# Patient Record
Sex: MALE
[De-identification: names, ages, dates, MRNs, and addresses within clinical notes are randomized per-mention and may not be internally consistent; named-entity substitution may affect disease eponyms.]

---

## 2018-02-14 ENCOUNTER — HOSPITAL ENCOUNTER (INPATIENT)
Dept: HOSPITAL 31 - C.ER | Age: 33
LOS: 5 days | Discharge: HOME | DRG: 744 | End: 2018-02-19
Attending: PSYCHIATRY & NEUROLOGY | Admitting: PSYCHIATRY & NEUROLOGY
Payer: MEDICAID

## 2018-02-14 DIAGNOSIS — B19.20: ICD-10-CM

## 2018-02-14 DIAGNOSIS — F17.200: ICD-10-CM

## 2018-02-14 DIAGNOSIS — F13.20: ICD-10-CM

## 2018-02-14 DIAGNOSIS — F10.10: ICD-10-CM

## 2018-02-14 DIAGNOSIS — F11.23: Primary | ICD-10-CM

## 2018-02-14 LAB
ALBUMIN SERPL-MCNC: 3.6 G/DL (ref 3.5–5)
ALBUMIN/GLOB SERPL: 1 {RATIO} (ref 1–2.1)
ALT SERPL-CCNC: 14 U/L (ref 21–72)
AST SERPL-CCNC: 25 U/L (ref 17–59)
BASOPHILS # BLD AUTO: 0.1 K/UL (ref 0–0.2)
BASOPHILS NFR BLD: 0.8 % (ref 0–2)
BUN SERPL-MCNC: 14 MG/DL (ref 9–20)
CALCIUM SERPL-MCNC: 9.1 MG/DL (ref 8.6–10.4)
EOSINOPHIL # BLD AUTO: 0.4 K/UL (ref 0–0.7)
EOSINOPHIL NFR BLD: 4.1 % (ref 0–4)
ERYTHROCYTE [DISTWIDTH] IN BLOOD BY AUTOMATED COUNT: 16.1 % (ref 11.5–14.5)
GFR NON-AFRICAN AMERICAN: > 60
HGB BLD-MCNC: 12.4 G/DL (ref 12–18)
LYMPHOCYTES # BLD AUTO: 2.3 K/UL (ref 1–4.3)
LYMPHOCYTES NFR BLD AUTO: 26 % (ref 20–40)
MCH RBC QN AUTO: 27 PG (ref 27–31)
MCHC RBC AUTO-ENTMCNC: 33 G/DL (ref 33–37)
MCV RBC AUTO: 81.9 FL (ref 80–94)
MONOCYTES # BLD: 0.7 K/UL (ref 0–0.8)
MONOCYTES NFR BLD: 7.6 % (ref 0–10)
NEUTROPHILS # BLD: 5.3 K/UL (ref 1.8–7)
NEUTROPHILS NFR BLD AUTO: 61.5 % (ref 50–75)
NRBC BLD AUTO-RTO: 0 % (ref 0–2)
PLATELET # BLD: 395 K/UL (ref 130–400)
PMV BLD AUTO: 7 FL (ref 7.2–11.7)
RBC # BLD AUTO: 4.59 MIL/UL (ref 4.4–5.9)
WBC # BLD AUTO: 8.7 K/UL (ref 4.8–10.8)

## 2018-02-14 NOTE — C.PDOC
History Of Present Illness


The patient presents to the ED requesting alcohol and heroin detox. Patient 

states he last used both this morning. Patient has already been prescreened. He 

denies suicidal/homicidal ideation and has no physical complaints at this time. 


Time Seen by Provider: 02/14/18 19:24


Chief Complaint (Nursing): Substance Abuse


History Per: Patient


History/Exam Limitations: no limitations


Onset/Duration Of Symptoms: Hrs


Current Symptoms Are (Timing): Still Present


Suicide/Self Injury Attempted (Context): None


Modifying Factor(s): Alcohol, Narcotics (heroin)


Severity: Mild


Pain Scale Rating Of: 2


Associated Symptoms: denies: Suicidal Thoughts, Suicidal Plan


Involuntary Hold By: None


Recent travel outside of the [place]: No


Additional History Per: Patient





Past Medical History


Reviewed: Historical Data, Nursing Documentation, Vital Signs


Vital Signs: 


 Last Vital Signs











Temp  98 F   02/14/18 19:06


 


Pulse  111 H  02/14/18 19:06


 


Resp  18   02/14/18 19:06


 


BP  145/89   02/14/18 19:06


 


Pulse Ox  100   02/14/18 19:51














- Medical History


PMH: No Chronic Diseases


Surgical History: No Surg Hx


Family History: States: Unknown Family Hx





- Social History


Hx Alcohol Use: Yes


Hx Substance Use: Yes





Review Of Systems


Constitutional: Negative for: Fever, Chills


ENT: Negative for: Throat Pain


Cardiovascular: Negative for: Chest Pain


Respiratory: Negative for: Cough, Shortness of Breath


Gastrointestinal: Negative for: Nausea, Vomiting, Abdominal Pain


Musculoskeletal: Negative for: Back Pain


Skin: Negative for: Rash


Neurological: Negative for: Weakness


Psych: Positive for: Other (prescreen for alcohol and heroin detox ).  Negative 

for: Suicidal ideation





Physical Exam





- Physical Exam


Appears: Non-toxic, No Acute Distress


Skin: Warm, Dry


Head: Normacephalic


Eye(s): bilateral: Normal Inspection


Oral Mucosa: Moist


Neck: Supple


Chest: Symmetrical, No Deformity, No Tenderness


Cardiovascular: Rhythm Regular, No Murmur


Respiratory: No Rales, No Rhonchi, No Wheezing


Gastrointestinal/Abdominal: Soft, No Tenderness


Male Genital: Normal Inspection


Extremity: Normal ROM, Capillary Refill (less than 2 seconds )


Extremity: Bilateral: Atraumatic


Neurological/Psych: Oriented x3


Gait: Steady





ED Course And Treatment





- Laboratory Results


Result Diagrams: 


 02/14/18 20:04





 02/14/18 20:04


O2 Sat by Pulse Oximetry: 100 (on RA)


Pulse Ox Interpretation: Normal


Progress Note: Bloodwork and urinalysis ordered and reviewed.





Disposition


Discussed With Dr.: Mary Moses


Comment: accepted the pt on his service and took over the care at 12:38AM


Doctor Will See Patient In The: Hospital


Counseled Patient/Family Regarding: Studies Performed, Diagnosis





- Disposition


Disposition: HOSPITALIZED


Disposition Time: 19:24


Condition: FAIR


Forms:  [facility] (English)





- POA


Present On Arrival: None





- Clinical Impression


Clinical Impression: 


 Drug abuse, Drug dependence, Alcohol abuse








- Scribe Statement


The provider has reviewed the documentation as recorded by the Scribe (Magdalena Navas)


Provider Attestation: 








All medical record entries made by the Scribe were at my direction and 

personally dictated by me. I have reviewed the chart and agree that the record 

accurately reflects my personal performance of the history, physical exam, 

medical decision making, and the department course for this patient. I have 

also personally directed, reviewed, and agree with the discharge instructions 

and disposition.





Decision To Admit





- Pt Status Changed To:


Hospital Disposition Of: Inpatient





- Admit Certification


Admit to Inpatient:: After my assessment, the patient will require 

hospitalization for at least two midnights.  This is because of the severity of 

symptoms shown, intensity of services needed, and/or the medical risk in this 

patient being treated as an outpatient.





- InPatient:


Physician Admission Certification: I certify that this patient requires 2 or 

more midnights of care for the following reason:: After my assessment, the 

patient will require hospitalization for at least two midnights.  This is 

because of the severity of symptoms shown, intensity of services needed, and/or 

the medical risk in this patient being treated as an outpatient.





- .


Bed Request Type: Detox


Admitting Physician: Mary Moses


Patient Diagnosis: 


 Drug abuse, Drug dependence, Alcohol abuse

## 2018-02-15 LAB
BILIRUB UR-MCNC: NEGATIVE MG/DL
GLUCOSE UR STRIP-MCNC: NORMAL MG/DL
LEUKOCYTE ESTERASE UR-ACNC: (no result) LEU/UL
PH UR STRIP: 5 [PH] (ref 5–8)
PROT UR STRIP-MCNC: NEGATIVE MG/DL
RBC # UR STRIP: NEGATIVE /UL
SP GR UR STRIP: 1.02 (ref 1–1.03)
URINE NITRATE: NEGATIVE
UROBILINOGEN UR-MCNC: NORMAL MG/DL (ref 0.2–1)

## 2018-02-15 PROCEDURE — HZ52ZZZ INDIVIDUAL PSYCHOTHERAPY FOR SUBSTANCE ABUSE TREATMENT, COGNITIVE-BEHAVIORAL: ICD-10-PCS | Performed by: PSYCHIATRY & NEUROLOGY

## 2018-02-15 PROCEDURE — HZ59ZZZ INDIVIDUAL PSYCHOTHERAPY FOR SUBSTANCE ABUSE TREATMENT, SUPPORTIVE: ICD-10-PCS | Performed by: PSYCHIATRY & NEUROLOGY

## 2018-02-15 PROCEDURE — HZ56ZZZ INDIVIDUAL PSYCHOTHERAPY FOR SUBSTANCE ABUSE TREATMENT, PSYCHOEDUCATION: ICD-10-PCS | Performed by: PSYCHIATRY & NEUROLOGY

## 2018-02-15 PROCEDURE — HZ81ZZZ MEDICATION MANAGEMENT FOR SUBSTANCE ABUSE TREATMENT, METHADONE MAINTENANCE: ICD-10-PCS | Performed by: PSYCHIATRY & NEUROLOGY

## 2018-02-15 PROCEDURE — HZ2ZZZZ DETOXIFICATION SERVICES FOR SUBSTANCE ABUSE TREATMENT: ICD-10-PCS | Performed by: PSYCHIATRY & NEUROLOGY

## 2018-02-15 NOTE — PCM.BM
<HeavenDelmi - Last Filed: 02/15/18 02:01>





Treatment Plan Problems





- Problems identified on initial assessmt


  ** Opiates dependence


Date Initiated: 02/15/18


Time Initiated: 01:15


Assessment reference: NA


Status: Active





Treatment assets and liabiliti


Patient Assests: cooperative, ADL independent, negotiates basic needs


Patient Liabilities: substance abuse





- Milieu Protocol


Maintain good personal hygiene: daily Encourage regular showers, daily Remind 

patient to perform daily oral care, daily Assist patient to perform ADL's


Maintain personal safety: every shift Educate patient to report safety concerns 

to staff, every shift Monitor environment for contraband/sharps


Medication safety: Monitor for expected outcome, potential side effects: every 

shift, Assess barriers to learning: every shift, Assess readiness for 

medication education: every shift





<Gilda Klein - Last Filed: 02/15/18 11:27>





Family Contact


Family involvement: Patient does not wish Family/SO involvement





- Goals for Treatment


Patient goals for treatment: Complete detox and apply for rehab.





Discharge/Continuing Care





- Education Needs


Education Needs: Family Diagnosis/Disease Process, Family Community resources, 

Patient Medication, Patient Diagnosis/Disease Process, Patient Coping Skills, 

Patient Anger Management skills, Patient Placement options, Patient Community 

resources





- Discharge


Discharge Criteria: Free of agitation, Normal sleep pattern, No longer 

exhibiting s/s of withdrawal, Reduction of target symptoms


Discharge to:: Substance Abuse Rehab





- Treatment Team Participation


Patient/Family/SO Statement: 





02/15/18 11:28


"I have [place] medicaid--I wanna go to rehab."


Discussed with Family/SO: No


Was Patient/Family/SO present at Treatment Team Meeting: Yes





<Mireya Kulkarni - Last Filed: 02/15/18 15:09>





- Diagnosis


(1) Opioid use disorder, severe, dependence


Status: Acute   


Interventions: 





02/15/18 15:09


* Assess 7x/week regarding severity of withdrawal


* Educate regarding risks, benefits, side effects and alternatives of 

medications


* Use Motivational Interviewing for abstinence


* Use CBT for relapse prevention


* Medication management for withdrawal symptoms


* Encourage medication assisted treatment


*

## 2018-02-16 RX ADMIN — BUPRENORPHINE HCL SCH MG: 2 TABLET SUBLINGUAL at 10:32

## 2018-02-16 NOTE — PCM.PYCHPN
Psychiatric Progress Note





- Psychiatric Progress Note


Patient seen today, length of contact: 15 min


Patient Chief Complaint: 


"I am withdrawing"





Problems Identified/Issues Discussed: 





The pt is seen, chart reviewed, case discussed with staff.


The pt is compliant with medications and reports no side-effects. Symptoms 

improving and patient needs more time to stabilize. After care discussed, 

support and psychoeducation given.The patient reports that he did not sleep 

last night because he had severe withdrawal symptoms. He denies asking the 

nurses for any of his PRN medications because he has not left his room. 








Medication Change: Yes (detox changes daily )


Medical Record Reviewed: Yes





Mental Status Examination





- Cognitive Function


Orientation: Person, Place, Situation, Time


Memory: Intact


Attention: WNL


Concentration: WNL


Association: WNL


Fund of Knowledge: [facility]


Decription of patient's judgement and insights: 





fair





- Mood


Mood: Depressed, Anxious





- Affect


Affect: Constricted





- Speech


Speech: Appropriate





- Formal Thought Process


Formal Thought Process: No Impairment





- Suicidal Ideation


Suicidal Ideation: No





- Homicidal Ideation


Homicidal Ideation: No





Goal/Treatment Plan





- Goal/Treatment Plan


Need for Continued Stay: Remain at risks for inpatient hospitalization, 

Discharge may exacerbated symptoms


Progress Toward Problem(s) and Goals/Treatment Plan: 





Opioid detox


Gabapentin for augmentation


As needed medications


All risks, benefits and alternatives of the meds discussed,


 and the pt agreed and understood. 


Attend groups and activities


Supportive therapy and psychoeducation


MI for abstinence


CBT for relapse prevention


Encourage MAT


Refer to rehab or IOP, and self-help groups


Smoking cessation with MI


Nicotine patch











- Smoking Cessation


Smoking Cessation Initiated: Yes

## 2018-02-17 RX ADMIN — BUPRENORPHINE HCL SCH MG: 2 TABLET SUBLINGUAL at 10:57

## 2018-02-17 NOTE — PCM.PYCHPN
Psychiatric Progress Note





- Psychiatric Progress Note


Patient seen today, length of contact: 15 min


Patient Chief Complaint: 





"I'm feeling better"


Problems Identified/Issues Discussed: 





The pt is seen, chart reviewed, case discussed with staff. Pt reported that he 

is feeling better. The pt is compliant with medications and reports no side-

effects.


Symptoms are improving but needs more time to stabilize.


After care discussed, support and psychoeducation given


DSM 5 Symptoms Update: 





opioid dependence, severe, withdrawal symptoms


benzo use disorder, severe, dependence


Medication Change: Yes (detox changes daily )


Medical Record Reviewed: Yes





Mental Status Examination





- Cognitive Function


Orientation: Person, Place, Situation, Time


Memory: Intact


Attention: WNL


Concentration: WNL


Association: WNL


Fund of Knowledge: [facility]


Decription of patient's judgement and insights: 





good/good


calm and cooperative





- Mood


Mood: Depressed, Anxious





- Affect


Affect: Constricted





- Speech


Speech: Appropriate





- Formal Thought Process


Formal Thought Process: No Impairment


Psychotic Thoughts and Behaviors: 





denied





- Suicidal Ideation


Suicidal Ideation: No


Plan: 





denied





- Homicidal Ideation


Homicidal Ideation: No


Plan: 





denied





Goal/Treatment Plan





- Goal/Treatment Plan


Need for Continued Stay: Remain at risks for inpatient hospitalization, 

Discharge may exacerbated symptoms


Progress Toward Problem(s) and Goals/Treatment Plan: 





continue current treatment and management as per primary team


Therapy in milieu





- Smoking Cessation


Smoking Cessation Initiated: Yes

## 2018-02-18 RX ADMIN — BUPRENORPHINE HCL SCH MG: 2 TABLET SUBLINGUAL at 10:43

## 2018-02-18 NOTE — PCM.PYCHPN
Psychiatric Progress Note





- Psychiatric Progress Note


Patient seen today, length of contact: 15 min


Patient Chief Complaint: 





"I'm feeling fine"


Problems Identified/Issues Discussed: 





The pt is seen, chart reviewed, case discussed with staff. Pt reported that he 

is feeling fine. The pt is compliant with medications and reports no side-

effects.


Symptoms are improving but needs more time to stabilize. He reported 

improvement in his withdrawal symptoms. 


After care discussed, support and psychoeducation given


DSM 5 Symptoms Update: 





Opioid use d/o, severe, with withdrawal symptoms


Sedative, hypnotic use disorder


Medication Change: Yes (detox changes daily )


Medical Record Reviewed: Yes





Mental Status Examination





- Cognitive Function


Orientation: Person, Place, Situation, Time


Memory: Intact


Attention: WNL


Concentration: WNL


Association: WNL


Fund of Knowledge: WNL


Decription of patient's judgement and insights: 





good/good


calm and cooperative 





- Mood


Mood: Depressed, Anxious





- Affect


Affect: Constricted





- Speech


Speech: Appropriate





- Formal Thought Process


Formal Thought Process: No Impairment


Psychotic Thoughts and Behaviors: 





denied 





- Suicidal Ideation


Suicidal Ideation: No


Plan: 





denied





- Homicidal Ideation


Homicidal Ideation: No


Plan: 





denied





Goal/Treatment Plan





- Goal/Treatment Plan


Need for Continued Stay: Remain at risks for inpatient hospitalization, 

Discharge may exacerbated symptoms


Progress Toward Problem(s) and Goals/Treatment Plan: 





continue current treatment and management as per primary team


Therapy in milieu


Supportive psychotherapy provided








- Smoking Cessation


Smoking Cessation Initiated: Yes

## 2018-02-19 VITALS
HEART RATE: 78 BPM | OXYGEN SATURATION: 100 % | TEMPERATURE: 98.1 F | DIASTOLIC BLOOD PRESSURE: 74 MMHG | RESPIRATION RATE: 20 BRPM | SYSTOLIC BLOOD PRESSURE: 109 MMHG

## 2018-02-19 RX ADMIN — BUPRENORPHINE HCL SCH MG: 2 TABLET SUBLINGUAL at 09:32

## 2018-02-19 NOTE — PCM.PYCHDC
Mental Status Examination





- Mental Status Examination


Orientation: Person, Place, Situation, Time


Memory: Intact


Mood: Neutral


Affect: Constricted


Speech: Soft


Attention: WNL


Concentration: WNL


Association: WNL


Fund of Knowledge: WNL


Formal Thought Process: No Impairment


Description of patient's judgement and insight: 





good, fair


Psychotic Thoughts and Behaviors: 





denies any AVH


Suicidal Ideation: No


Current Homicidal Ideation?: No





Discharge Summary





- Discharge Note


Reason for Hospitalization: 





Patient is a 33 year old single  male with no children. The patient is 

currently unemployed and lives with his dad. The patient reports shooting 20 

bags of heroin and shooting cocaine daily beginning 10 years ago The patient 

also uses 1-2 sticks of Xanax, drinks alcohol, and smokes cigarettes daily. The 

patient denies other drugs use and denies using pain killers. The patient 

denies ever having a seizure, but does report tremors when he stops Xanax or 

drinking. The patient denies every doing to a methadone program in the past. 





Detox Hx: twice 


Rehab Hx: twice 


Medical Hx: Hep C positive 


Medications:denies 


Psych Hx: denies


Fam Hx: denies


Fam Substance Abuse Hx - denies 











Consultations:: List each consultation separately and include:  1. Reason for 

request.  2. Findings.  3. Follow-up


Summary of Hospital Course include:: 1. Description of specific treatment plan 

utilized for patients during their course of treatmen.  2. Summarize the time-

course for resolution of acute symptoms and/or regressed behaviors.  3. 

Describe issues identified and worked on during hospitalization.  4. Describe 

medication utilized.  5. Describe medical problems identified and treated.  6. 

Reassessment of suicide risk


Summary of Hospital Course: 





During the course of his stay, patient (pt) started progressively improving and 

he reports improvement in his mood and withdrawal symptoms. He started 

attending groups and meetings and started socializing. Patient denied any 

feelings of hopelessness, helplessness, and worthlessness, denied any problem 

with the sleep or appetite, denied suicidal ideation or homicidal ideation. Pt 

denied any auditory or visual hallucinations.  





Some changes were made in his current medications and patient was discharged on 

following medications. He tolerated these medications very well and denied any 

side effects. 





He is interested in intensive outpatient program and Vivitrol injection. 











- Final Diagnosis (DSM 5)


Condition upon Discharge: FAIR


DSM 5: 








Opioid Withdrawal


Opioid Use d/o - severe


Alcohol Use Disorder


Benzodiazepine Use Disorder 





Disposition: HOME/ ROUTINE


Follow-up Treatment Plan: 








Education:


Pt was educated and counseled about the risks and benefits of taking and not 

taking medications.  





Pt was educated and counseled about the risks of drinking and abusing drugs.   





Pt was educated and counseled to go to the ER or call 911 if pt develop 

suicidal ideation or homicidal ideation, worsening of symptoms or severe side 

effects of the meds.








Prescriptions/Medication Reconciliation: 


Gabapentin [Neurontin] 300 mg PO BID #60 cap


traZODone [Desyrel] 100 mg PO HS PRN #30 tab


 PRN Reason: Insomnia





- Smoking Cessation


Smoking Cessation Medication prescribed: No





- Antipsychotic Medications


Pt discharged on 2 or more routine antipsychotic medications: No